# Patient Record
Sex: MALE | Race: BLACK OR AFRICAN AMERICAN | Employment: FULL TIME | URBAN - METROPOLITAN AREA
[De-identification: names, ages, dates, MRNs, and addresses within clinical notes are randomized per-mention and may not be internally consistent; named-entity substitution may affect disease eponyms.]

---

## 2019-01-02 ENCOUNTER — HOSPITAL ENCOUNTER (EMERGENCY)
Age: 34
Discharge: HOME | End: 2019-01-02
Payer: COMMERCIAL

## 2019-01-02 ENCOUNTER — APPOINTMENT (OUTPATIENT)
Dept: GENERAL RADIOLOGY | Age: 34
End: 2019-01-02
Payer: COMMERCIAL

## 2019-01-02 VITALS
WEIGHT: 215 LBS | OXYGEN SATURATION: 98 % | HEART RATE: 92 BPM | HEIGHT: 71 IN | SYSTOLIC BLOOD PRESSURE: 168 MMHG | TEMPERATURE: 98 F | BODY MASS INDEX: 30.1 KG/M2 | DIASTOLIC BLOOD PRESSURE: 84 MMHG | RESPIRATION RATE: 16 BRPM

## 2019-01-02 DIAGNOSIS — S61.409A: Primary | ICD-10-CM

## 2019-01-02 PROCEDURE — 87071 CULTURE AEROBIC QUANT OTHER: CPT

## 2019-01-02 PROCEDURE — 87077 CULTURE AEROBIC IDENTIFY: CPT

## 2019-01-02 PROCEDURE — 73130 X-RAY EXAM OF HAND: CPT

## 2019-01-02 PROCEDURE — 90471 IMMUNIZATION ADMIN: CPT | Performed by: PHYSICIAN ASSISTANT

## 2019-01-02 PROCEDURE — 90715 TDAP VACCINE 7 YRS/> IM: CPT | Performed by: PHYSICIAN ASSISTANT

## 2019-01-02 PROCEDURE — 99283 EMERGENCY DEPT VISIT LOW MDM: CPT

## 2019-01-02 PROCEDURE — 6360000002 HC RX W HCPCS: Performed by: PHYSICIAN ASSISTANT

## 2019-01-02 PROCEDURE — 87186 SC STD MICRODIL/AGAR DIL: CPT

## 2019-01-02 PROCEDURE — 6370000000 HC RX 637 (ALT 250 FOR IP): Performed by: PHYSICIAN ASSISTANT

## 2019-01-02 PROCEDURE — 87073 CULTURE BACTERIA ANAEROBIC: CPT

## 2019-01-02 RX ORDER — SULFAMETHOXAZOLE AND TRIMETHOPRIM 800; 160 MG/1; MG/1
1 TABLET ORAL 2 TIMES DAILY
Qty: 14 TABLET | Refills: 0 | Status: SHIPPED | OUTPATIENT
Start: 2019-01-02 | End: 2019-01-07

## 2019-01-02 RX ORDER — SULFAMETHOXAZOLE AND TRIMETHOPRIM 800; 160 MG/1; MG/1
1 TABLET ORAL ONCE
Status: COMPLETED | OUTPATIENT
Start: 2019-01-02 | End: 2019-01-02

## 2019-01-02 RX ADMIN — TETANUS TOXOID, REDUCED DIPHTHERIA TOXOID AND ACELLULAR PERTUSSIS VACCINE, ADSORBED 0.5 ML: 5; 2.5; 8; 8; 2.5 SUSPENSION INTRAMUSCULAR at 19:23

## 2019-01-02 RX ADMIN — SULFAMETHOXAZOLE AND TRIMETHOPRIM 1 TABLET: 800; 160 TABLET ORAL at 19:20

## 2019-01-06 LAB
CULTURE: NORMAL
CULTURE: NORMAL
Lab: NORMAL
ORGANISM: NORMAL
REPORT STATUS: NORMAL
SPECIMEN: NORMAL

## 2019-01-07 ENCOUNTER — HOSPITAL ENCOUNTER (EMERGENCY)
Age: 34
Discharge: HOME | End: 2019-01-07
Attending: EMERGENCY MEDICINE
Payer: COMMERCIAL

## 2019-01-07 VITALS
BODY MASS INDEX: 30.1 KG/M2 | TEMPERATURE: 98.6 F | SYSTOLIC BLOOD PRESSURE: 159 MMHG | HEIGHT: 71 IN | OXYGEN SATURATION: 98 % | RESPIRATION RATE: 18 BRPM | DIASTOLIC BLOOD PRESSURE: 108 MMHG | HEART RATE: 87 BPM | WEIGHT: 215 LBS

## 2019-01-07 DIAGNOSIS — T78.40XA ALLERGIC REACTION, INITIAL ENCOUNTER: Primary | ICD-10-CM

## 2019-01-07 PROCEDURE — 96374 THER/PROPH/DIAG INJ IV PUSH: CPT

## 2019-01-07 PROCEDURE — S0028 INJECTION, FAMOTIDINE, 20 MG: HCPCS | Performed by: EMERGENCY MEDICINE

## 2019-01-07 PROCEDURE — 99282 EMERGENCY DEPT VISIT SF MDM: CPT

## 2019-01-07 PROCEDURE — 96372 THER/PROPH/DIAG INJ SC/IM: CPT

## 2019-01-07 PROCEDURE — 96375 TX/PRO/DX INJ NEW DRUG ADDON: CPT

## 2019-01-07 PROCEDURE — 6360000002 HC RX W HCPCS: Performed by: EMERGENCY MEDICINE

## 2019-01-07 RX ORDER — DIPHENHYDRAMINE HYDROCHLORIDE 50 MG/ML
25 INJECTION INTRAMUSCULAR; INTRAVENOUS ONCE
Status: COMPLETED | OUTPATIENT
Start: 2019-01-07 | End: 2019-01-07

## 2019-01-07 RX ORDER — DIPHENHYDRAMINE HCL 25 MG
50 TABLET ORAL EVERY 6 HOURS PRN
COMMUNITY

## 2019-01-07 RX ORDER — EPINEPHRINE 0.3 MG/.3ML
0.3 INJECTION SUBCUTANEOUS
Qty: 1 EACH | Refills: 0 | Status: SHIPPED | OUTPATIENT
Start: 2019-01-07 | End: 2019-01-07

## 2019-01-07 RX ORDER — METHYLPREDNISOLONE SODIUM SUCCINATE 125 MG/2ML
125 INJECTION, POWDER, LYOPHILIZED, FOR SOLUTION INTRAMUSCULAR; INTRAVENOUS ONCE
Status: COMPLETED | OUTPATIENT
Start: 2019-01-07 | End: 2019-01-07

## 2019-01-07 RX ORDER — EPINEPHRINE 1 MG/ML
0.3 INJECTION, SOLUTION, CONCENTRATE INTRAVENOUS ONCE
Status: COMPLETED | OUTPATIENT
Start: 2019-01-07 | End: 2019-01-07

## 2019-01-07 RX ORDER — DOXYCYCLINE HYCLATE 100 MG
100 TABLET ORAL 2 TIMES DAILY
Qty: 20 TABLET | Refills: 0 | Status: SHIPPED | OUTPATIENT
Start: 2019-01-07 | End: 2019-01-17

## 2019-01-07 RX ORDER — PREDNISONE 10 MG/1
60 TABLET ORAL DAILY
Qty: 30 TABLET | Refills: 0 | Status: SHIPPED | OUTPATIENT
Start: 2019-01-08 | End: 2019-01-13

## 2019-01-07 RX ORDER — FAMOTIDINE 20 MG/1
20 TABLET, FILM COATED ORAL 2 TIMES DAILY
Qty: 14 TABLET | Refills: 0 | Status: SHIPPED | OUTPATIENT
Start: 2019-01-07 | End: 2020-08-08

## 2019-01-07 RX ORDER — DIPHENHYDRAMINE HCL 25 MG
25 CAPSULE ORAL EVERY 6 HOURS PRN
Qty: 40 CAPSULE | Refills: 0 | Status: SHIPPED | OUTPATIENT
Start: 2019-01-07 | End: 2019-01-17

## 2019-01-07 RX ADMIN — FAMOTIDINE 20 MG: 10 INJECTION, SOLUTION INTRAVENOUS at 10:05

## 2019-01-07 RX ADMIN — EPINEPHRINE 0.3 MG: 1 INJECTION, SOLUTION, CONCENTRATE INTRAVENOUS at 10:24

## 2019-01-07 RX ADMIN — METHYLPREDNISOLONE SODIUM SUCCINATE 125 MG: 125 INJECTION, POWDER, LYOPHILIZED, FOR SOLUTION INTRAMUSCULAR; INTRAVENOUS at 10:28

## 2019-01-07 RX ADMIN — DIPHENHYDRAMINE HYDROCHLORIDE 25 MG: 50 INJECTION INTRAMUSCULAR; INTRAVENOUS at 10:00

## 2019-01-07 ASSESSMENT — ENCOUNTER SYMPTOMS
EYES NEGATIVE: 1
ALLERGIC/IMMUNOLOGIC NEGATIVE: 1
GASTROINTESTINAL NEGATIVE: 1
TROUBLE SWALLOWING: 1
RESPIRATORY NEGATIVE: 1
FACIAL SWELLING: 1

## 2019-01-07 ASSESSMENT — PAIN DESCRIPTION - PAIN TYPE: TYPE: ACUTE PAIN

## 2019-01-07 ASSESSMENT — PAIN SCALES - GENERAL: PAINLEVEL_OUTOF10: 4

## 2019-01-07 ASSESSMENT — PAIN DESCRIPTION - LOCATION: LOCATION: FACE;MOUTH

## 2019-01-07 ASSESSMENT — PAIN DESCRIPTION - FREQUENCY: FREQUENCY: CONTINUOUS

## 2019-01-07 ASSESSMENT — PAIN DESCRIPTION - DESCRIPTORS: DESCRIPTORS: BURNING

## 2020-08-08 ENCOUNTER — HOSPITAL ENCOUNTER (EMERGENCY)
Age: 35
Discharge: HOME | End: 2020-08-08
Payer: COMMERCIAL

## 2020-08-08 VITALS
SYSTOLIC BLOOD PRESSURE: 164 MMHG | DIASTOLIC BLOOD PRESSURE: 113 MMHG | TEMPERATURE: 98.6 F | BODY MASS INDEX: 28 KG/M2 | WEIGHT: 200 LBS | RESPIRATION RATE: 17 BRPM | OXYGEN SATURATION: 100 % | HEIGHT: 71 IN | HEART RATE: 91 BPM

## 2020-08-08 LAB
ALBUMIN SERPL-MCNC: 4.5 GM/DL (ref 3.4–5)
ALP BLD-CCNC: 49 IU/L (ref 40–129)
ALT SERPL-CCNC: 35 U/L (ref 10–40)
ANION GAP SERPL CALCULATED.3IONS-SCNC: 13 MMOL/L (ref 4–16)
AST SERPL-CCNC: 45 IU/L (ref 15–37)
BASOPHILS ABSOLUTE: 0.1 K/CU MM
BASOPHILS RELATIVE PERCENT: 1.4 % (ref 0–1)
BILIRUB SERPL-MCNC: 0.4 MG/DL (ref 0–1)
BUN BLDV-MCNC: 5 MG/DL (ref 6–23)
CALCIUM SERPL-MCNC: 9.2 MG/DL (ref 8.3–10.6)
CHLORIDE BLD-SCNC: 100 MMOL/L (ref 99–110)
CO2: 27 MMOL/L (ref 21–32)
CREAT SERPL-MCNC: 1.1 MG/DL (ref 0.9–1.3)
DIFFERENTIAL TYPE: ABNORMAL
EOSINOPHILS ABSOLUTE: 0.2 K/CU MM
EOSINOPHILS RELATIVE PERCENT: 3 % (ref 0–3)
GFR AFRICAN AMERICAN: >60 ML/MIN/1.73M2
GFR NON-AFRICAN AMERICAN: >60 ML/MIN/1.73M2
GLUCOSE BLD-MCNC: 84 MG/DL (ref 70–99)
HCT VFR BLD CALC: 43.1 % (ref 42–52)
HEMOGLOBIN: 14.8 GM/DL (ref 13.5–18)
IMMATURE NEUTROPHIL %: 0.2 % (ref 0–0.43)
LYMPHOCYTES ABSOLUTE: 2.5 K/CU MM
LYMPHOCYTES RELATIVE PERCENT: 50.2 % (ref 24–44)
MCH RBC QN AUTO: 28.8 PG (ref 27–31)
MCHC RBC AUTO-ENTMCNC: 34.3 % (ref 32–36)
MCV RBC AUTO: 83.9 FL (ref 78–100)
MONOCYTES ABSOLUTE: 0.5 K/CU MM
MONOCYTES RELATIVE PERCENT: 10.4 % (ref 0–4)
NUCLEATED RBC %: 0 %
PDW BLD-RTO: 13.7 % (ref 11.7–14.9)
PLATELET # BLD: 294 K/CU MM (ref 140–440)
PMV BLD AUTO: 10.1 FL (ref 7.5–11.1)
POTASSIUM SERPL-SCNC: 3.4 MMOL/L (ref 3.5–5.1)
RBC # BLD: 5.14 M/CU MM (ref 4.6–6.2)
SEGMENTED NEUTROPHILS ABSOLUTE COUNT: 1.8 K/CU MM
SEGMENTED NEUTROPHILS RELATIVE PERCENT: 34.8 % (ref 36–66)
SODIUM BLD-SCNC: 140 MMOL/L (ref 135–145)
TOTAL IMMATURE NEUTOROPHIL: 0.01 K/CU MM
TOTAL NUCLEATED RBC: 0 K/CU MM
TOTAL PROTEIN: 7 GM/DL (ref 6.4–8.2)
WBC # BLD: 5 K/CU MM (ref 4–10.5)

## 2020-08-08 PROCEDURE — 80053 COMPREHEN METABOLIC PANEL: CPT

## 2020-08-08 PROCEDURE — 85025 COMPLETE CBC W/AUTO DIFF WBC: CPT

## 2020-08-08 PROCEDURE — 99283 EMERGENCY DEPT VISIT LOW MDM: CPT

## 2020-08-08 RX ORDER — ONDANSETRON 4 MG/1
4 TABLET, ORALLY DISINTEGRATING ORAL EVERY 8 HOURS PRN
Qty: 15 TABLET | Refills: 0 | Status: SHIPPED | OUTPATIENT
Start: 2020-08-08

## 2020-08-08 RX ORDER — OMEPRAZOLE 20 MG/1
20 CAPSULE, DELAYED RELEASE ORAL
Qty: 20 CAPSULE | Refills: 0 | Status: SHIPPED | OUTPATIENT
Start: 2020-08-08 | End: 2020-08-18

## 2020-08-08 NOTE — ED NOTES
Pt presents to ED for an episode of blood tinged emesis.  Pt denies any pain or discomfort, states he had just finished dinner before the episode       Brandyn Beltran RN  08/08/20 2429

## 2020-08-08 NOTE — ED PROVIDER NOTES
EMERGENCY DEPARTMENT ENCOUNTER      PCP: No primary care provider on file. CHIEF COMPLAINT    Chief Complaint   Patient presents with    Hematemesis       This patient was not evaluated by the attending physician. I have independently evaluated this patient. HPI    Alee Cortez is a 38 y.o. male who presents with episode of vomiting earlier today after eating. Patient states he became nauseous and vomited and there was blood in the vomit. Patient states he drinks approximately three 4 locus per day. Patient denies taking anti-inflammatories. Patient denies abdominal pain, chest pain, shortness of breath. Patient denies any diarrhea or blood in stool. Patient denies history of ulcers. REVIEW OF SYSTEMS    Constitutional:  Denies fever   HENT:  Denies sore throat or ear pain   Cardiovascular:  Denies chest pain   Respiratory:  Denies cough or shortness of breath   GI:  See HPI above  : No hematuria or dysuria. Musculoskeletal: No pain or swelling of extremities. Skin:  Denies rash  Neurologic:  Denies headache, focal weakness or sensory changes   Lymphatic:  Denies swollen glands     All other review of systems are negative  See HPI and nursing notes for additional information     [address]    History reviewed. No pertinent past medical history. History reviewed. No pertinent surgical history.     CURRENT MEDICATIONS    Current Outpatient Rx   Medication Sig Dispense Refill    omeprazole (PRILOSEC) 20 MG delayed release capsule Take 1 capsule by mouth 2 times daily (before meals) for 10 days 20 capsule 0    ondansetron (ZOFRAN ODT) 4 MG disintegrating tablet Take 1 tablet by mouth every 8 hours as needed for Nausea or Vomiting 15 tablet 0    diphenhydrAMINE (BENADRYL) 25 MG tablet Take 50 mg by mouth every 6 hours as needed for Itching      EPINEPHrine (EPIPEN 2-ANGELICA) 0.3 MG/0.3ML SOAJ injection Inject 0.3 mLs into the muscle once as needed (severe allergic reaction) Inject into lateral thigh muscle. 1 each 0       ALLERGIES    No Known Allergies    SOCIAL AND FAMILY HISTORY    Social History     Socioeconomic History    Marital status: Single     Spouse name: None    Number of children: None    Years of education: None    Highest education level: None   Occupational History    None   Social Needs    Financial resource strain: None    Food insecurity     Worry: None     Inability: None    Transportation needs     Medical: None     Non-medical: None   Tobacco Use    Smoking status: Current Some Day Smoker     Packs/day: 0.50    Smokeless tobacco: Never Used   Substance and Sexual Activity    Alcohol use: Yes     Comment: 2 beers a day    Drug use: No    Sexual activity: None   Lifestyle    Physical activity     Days per week: None     Minutes per session: None    Stress: None   Relationships    Social connections     Talks on phone: None     Gets together: None     Attends [employer]ism service: None     Active member of club or organization: None     Attends meetings of clubs or organizations: None     Relationship status: None    Intimate partner violence     Fear of current or ex partner: None     Emotionally abused: None     Physically abused: None     Forced sexual activity: None   Other Topics Concern    None   Social History Narrative    None     History reviewed. No pertinent family history. PHYSICAL EXAM    VITAL SIGNS: BP (!) 164/113   Pulse 91   Temp 98.6 °F (37 °C) (Oral)   Resp 17   Ht 5' 11\" (1.803 m)   Wt 200 lb (90.7 kg)   SpO2 100%   BMI 27.89 kg/m²   Constitutional:  Well developed, well nourished  Eyes:  Sclera nonicteric, conjunctiva moist  HENT:  Atraumatic. PERRL. EOMI.  moist mucus membranes. Neck/Lymphatics: supple, no JVD, no swollen nodes  Respiratory:  No retractions, no accessory muscle use, normal breath sounds   Cardiovascular:  normal rate, normal rhythm, no murmurs    GI:     No gross discoloration.     Bowel sounds present, no audible bruits. Soft,  no guarding,   no abdominal tenderness, no rebound, no palpable pulsatile masses  Back:   No CVA tenderness to percussion. Musculoskeletal:  No edema, no deformity  Integument: No rash, dry skin  Neurologic:  Alert & oriented, normal speech  Psychiatric: Cooperative, pleasant affect       LABS:  Results for orders placed or performed during the hospital encounter of 08/08/20   CBC auto diff   Result Value Ref Range    WBC 5.0 4.0 - 10.5 K/CU MM    RBC 5.14 4.6 - 6.2 M/CU MM    Hemoglobin 14.8 13.5 - 18.0 GM/DL    Hematocrit 43.1 42 - 52 %    MCV 83.9 78 - 100 FL    MCH 28.8 27 - 31 PG    MCHC 34.3 32.0 - 36.0 %    RDW 13.7 11.7 - 14.9 %    Platelets 684 833 - 587 K/CU MM    MPV 10.1 7.5 - 11.1 FL    Differential Type AUTOMATED DIFFERENTIAL     Segs Relative 34.8 (L) 36 - 66 %    Lymphocytes % 50.2 (H) 24 - 44 %    Monocytes % 10.4 (H) 0 - 4 %    Eosinophils % 3.0 0 - 3 %    Basophils % 1.4 (H) 0 - 1 %    Segs Absolute 1.8 K/CU MM    Lymphocytes Absolute 2.5 K/CU MM    Monocytes Absolute 0.5 K/CU MM    Eosinophils Absolute 0.2 K/CU MM    Basophils Absolute 0.1 K/CU MM    Nucleated RBC % 0.0 %    Total Nucleated RBC 0.0 K/CU MM    Total Immature Neutrophil 0.01 K/CU MM    Immature Neutrophil % 0.2 0 - 0.43 %   CMP   Result Value Ref Range    Sodium 140 135 - 145 MMOL/L    Potassium 3.4 (L) 3.5 - 5.1 MMOL/L    Chloride 100 99 - 110 mMol/L    CO2 27 21 - 32 MMOL/L    BUN 5 (L) 6 - 23 MG/DL    CREATININE 1.1 0.9 - 1.3 MG/DL    Glucose 84 70 - 99 MG/DL    Calcium 9.2 8.3 - 10.6 MG/DL    Alb 4.5 3.4 - 5.0 GM/DL    Total Protein 7.0 6.4 - 8.2 GM/DL    Total Bilirubin 0.4 0.0 - 1.0 MG/DL    ALT 35 10 - 40 U/L    AST 45 (H) 15 - 37 IU/L    Alkaline Phosphatase 49 40 - 129 IU/L    GFR Non-African American >60 >60 mL/min/1.73m2    GFR African American >60 >60 mL/min/1.73m2    Anion Gap 13 4 - 16         ED COURSE & MEDICAL DECISION MAKING      Patient presents as above.   Patient is well-appearing, nontoxic. Abdomen is soft, nontender. Vitals are stable. CBC with stable hemoglobin 14.8 and hematocrit of 43.1, platelets of 672. CMP with BUN of 5, creatinine 3.4, AST of 45. Patient denies any further nausea. Patient denies history of ulcers. Consult to the gastroenterologist Dr. Mahsa Bedoya who agrees with plan for outpatient follow-up, starting patient on omeprazole 20 mg twice daily and liquid diet today with gradual increase in diet as tolerated. I discussed this plan with patient who is agreeable. I discussed with patient importance return immediately if new or worsening symptoms develop. Clinical  IMPRESSION    1. Hematemesis, presence of nausea not specified        Discussed with patient importance return the emergency department immediately if new or worsening symptoms develop. Comment: Please note this report has been produced using speech recognition software and may contain errors related to that system including errors in grammar, punctuation, and spelling, as well as words and phrases that may be inappropriate. If there are any questions or concerns please feel free to contact the dictating provider for clarification.       Shmuel Cantu PA-C  08/08/20 2015

## 2021-04-18 ENCOUNTER — HOSPITAL ENCOUNTER (EMERGENCY)
Age: 36
Discharge: HOME | End: 2021-04-18
Payer: COMMERCIAL

## 2021-04-18 VITALS
OXYGEN SATURATION: 100 % | WEIGHT: 210 LBS | TEMPERATURE: 98.2 F | SYSTOLIC BLOOD PRESSURE: 164 MMHG | BODY MASS INDEX: 28.44 KG/M2 | RESPIRATION RATE: 15 BRPM | DIASTOLIC BLOOD PRESSURE: 98 MMHG | HEIGHT: 72 IN | HEART RATE: 88 BPM

## 2021-04-18 DIAGNOSIS — H61.892 SWELLING OF LEFT EXTERNAL EAR: Primary | ICD-10-CM

## 2021-04-18 PROCEDURE — 6370000000 HC RX 637 (ALT 250 FOR IP): Performed by: PHYSICIAN ASSISTANT

## 2021-04-18 PROCEDURE — 99283 EMERGENCY DEPT VISIT LOW MDM: CPT

## 2021-04-18 RX ORDER — CIPROFLOXACIN 500 MG/1
500 TABLET, FILM COATED ORAL ONCE
Status: COMPLETED | OUTPATIENT
Start: 2021-04-18 | End: 2021-04-18

## 2021-04-18 RX ORDER — CIPROFLOXACIN 500 MG/1
500 TABLET, FILM COATED ORAL 2 TIMES DAILY
Qty: 20 TABLET | Refills: 0 | Status: SHIPPED | OUTPATIENT
Start: 2021-04-18 | End: 2021-04-28

## 2021-04-18 RX ADMIN — CIPROFLOXACIN HYDROCHLORIDE 500 MG: 500 TABLET, FILM COATED ORAL at 19:35

## 2021-04-18 NOTE — ED PROVIDER NOTES
[facility] [address] EMERGENCY DEPARTMENT    Patient: Marnie Castro  MRN: [id number]  : 1985  Date of Evaluation: 2021  ED Provider: Georgianna Burkitt, PA-C    Chief Complaint       Chief Complaint   Patient presents with    Other     left ear swelling, CLARENATHAN       Selvin Villatoro is a 39 y.o. male who presents to the emergency department for swelling of the external left ear. Onset was about a week ago. Constant since onset. He thought maybe he was bit by a bug/stung by something but does not recall a specific incident. He denies any direct trauma to the ear. He states he has been applying topical steroid cream without relief. Denies any associated pain. Denies any redness, warmth, or drainage. Denies any pain to the internal ear. Denies hearing changes. Denies fever or chills. ROS     CONSTITUTIONAL:  Denies fever. HEAD:  Denies headache. ENT:  + left ear swelling. INTEGUMENT:  Denies skin changes. LYMPHATIC:  Denies lymphadenopathy. Past History   History reviewed. No pertinent past medical history. History reviewed. No pertinent surgical history.   Social History     Socioeconomic History    Marital status: Single     Spouse name: None    Number of children: None    Years of education: None    Highest education level: None   Occupational History    None   Social Needs    Financial resource strain: None    Food insecurity     Worry: None     Inability: None    Transportation needs     Medical: None     Non-medical: None   Tobacco Use    Smoking status: Current Some Day Smoker     Packs/day: 0.50    Smokeless tobacco: Never Used   Substance and Sexual Activity    Alcohol use: Yes     Comment: 2 beers a day    Drug use: No    Sexual activity: None   Lifestyle    Physical activity     Days per week: None     Minutes per session: None    Stress: None   Relationships    Social connections     Talks on phone: None     Gets together: None     Attends Muslim service: None     Active member of club or organization: None     Attends meetings of clubs or organizations: None     Relationship status: None    Intimate partner violence     Fear of current or ex partner: None     Emotionally abused: None     Physically abused: None     Forced sexual activity: None   Other Topics Concern    None   Social History Narrative    None       Medications/Allergies     Discharge Medication List as of 4/18/2021  7:38 PM      CONTINUE these medications which have NOT CHANGED    Details   omeprazole (PRILOSEC) 20 MG delayed release capsule Take 1 capsule by mouth 2 times daily (before meals) for 10 days, Disp-20 capsule,R-0Print      ondansetron (ZOFRAN ODT) 4 MG disintegrating tablet Take 1 tablet by mouth every 8 hours as needed for Nausea or Vomiting, Disp-15 tablet,R-0Print      diphenhydrAMINE (BENADRYL) 25 MG tablet Take 50 mg by mouth every 6 hours as needed for ItchingHistorical Med      EPINEPHrine (EPIPEN 2-ANGELICA) 0.3 MG/0.3ML SOAJ injection Inject 0.3 mLs into the muscle once as needed (severe allergic reaction) Inject into lateral thigh muscle., Disp-1 each, R-0Print           No Known Allergies     Physical Exam       ED Triage Vitals [04/18/21 1717]   BP Temp Temp Source Pulse Resp SpO2 Height Weight   (!) 163/122 98.1 °F (36.7 °C) Oral 90 16 100 % 6' (1.829 m) 210 lb (95.3 kg)     GENERAL APPEARANCE:  Well-developed, well-nourished, no acute distress. HEAD:  NC/AT. EYES:  Sclera anicteric. ENT:   Nares patent, oropharynx moist.  There is fluctuant swelling to the anterior and posterior left upper ear overlying the cartilage. There is no appreciable erythema or warmth. There is no tenderness to palpation of the ear, no mastoid swelling or tenderness. No lymphadenopathy noted. Canal is non-edematous, clear of cerumen, TM is pearly and intact. NECK:  Supple.   LUNGS:    Respirations

## 2025-02-05 ENCOUNTER — HOSPITAL ENCOUNTER (EMERGENCY)
Age: 40
Discharge: HOME | End: 2025-02-05
Payer: COMMERCIAL

## 2025-02-05 VITALS
RESPIRATION RATE: 15 BRPM | HEART RATE: 77 BPM | OXYGEN SATURATION: 99 % | DIASTOLIC BLOOD PRESSURE: 117 MMHG | TEMPERATURE: 97.5 F | SYSTOLIC BLOOD PRESSURE: 166 MMHG

## 2025-02-05 DIAGNOSIS — L03.213 PRESEPTAL CELLULITIS OF RIGHT UPPER EYELID: Primary | ICD-10-CM

## 2025-02-05 DIAGNOSIS — R03.0 ELEVATED BLOOD PRESSURE READING: ICD-10-CM

## 2025-02-05 PROCEDURE — 99283 EMERGENCY DEPT VISIT LOW MDM: CPT

## 2025-02-05 RX ORDER — CLINDAMYCIN HYDROCHLORIDE 300 MG/1
300 CAPSULE ORAL 3 TIMES DAILY
Qty: 21 CAPSULE | Refills: 0 | Status: SHIPPED | OUTPATIENT
Start: 2025-02-05 | End: 2025-02-12

## 2025-02-05 ASSESSMENT — PAIN - FUNCTIONAL ASSESSMENT
PAIN_FUNCTIONAL_ASSESSMENT: NONE - DENIES PAIN
PAIN_FUNCTIONAL_ASSESSMENT: NONE - DENIES PAIN

## 2025-02-05 NOTE — ED PROVIDER NOTES
[facility] EMERGENCY DEPARTMENT  EMERGENCY DEPARTMENT ENCOUNTER        Pt Name: Ruddy Page  MRN: [id number]  Birthdate 1985  Date of evaluation: 2/5/2025  Provider: Reginald Campbell PA-C  PCP: No primary care provider on file.  Note Started: 3:02 PM EST 2/5/25      BHAVNA. I have evaluated this patient.        CHIEF COMPLAINT       Chief Complaint   Patient presents with    Eye Problem     Pt reports to Ed with Swelling in right eye, started 2 days ago. Pt reports using OTC eye cream with no relief.       HISTORY OF PRESENT ILLNESS: 1 or more Elements     History From: patient    Ruddy Page is a 40 y.o. male who presents complaining of swelling of the right upper eyelid for 2 days.  Patient reports small red swollen, painful knot on his right upper lateral eyelid.  He states he had the same thing happened about a year ago, took antibiotics and it went away.  He denies any trauma, vision change, fever, headache.  Blood pressure elevated, states he is anxious and does not usually have elevated blood pressure.  Denies any vision change, chest pain, shortness of breath, lower extremity edema, changes in urination.    Nursing Notes were all reviewed and agreed with or any disagreements were addressed in the HPI.    REVIEW OF SYSTEMS :      Review of Systems   All other systems reviewed and are negative.      Positives and Pertinent negatives as per HPI.       PAST MEDICAL HISTORY    has no past medical history on file.     SURGICAL HISTORY   No past surgical history on file.    CURRENTMEDICATIONS       Discharge Medication List as of 2/5/2025  2:51 PM        CONTINUE these medications which have NOT CHANGED    Details   naproxen (NAPROSYN) 500 MG tablet Take 1 tablet by mouth 2 times daily (with meals), Disp-60 tablet, R-0Normal      omeprazole (PRILOSEC) 20 MG delayed release capsule Take 1 capsule by mouth 2 times daily (before meals) for 10 days, Disp-20 capsule,R-0Print